# Patient Record
Sex: MALE | Race: WHITE | NOT HISPANIC OR LATINO | URBAN - METROPOLITAN AREA
[De-identification: names, ages, dates, MRNs, and addresses within clinical notes are randomized per-mention and may not be internally consistent; named-entity substitution may affect disease eponyms.]

---

## 2024-12-20 ENCOUNTER — OFFICE VISIT (OUTPATIENT)
Dept: PRIMARY CARE | Facility: CLINIC | Age: 24
End: 2024-12-20
Payer: MEDICAID

## 2024-12-20 ENCOUNTER — TELEPHONE (OUTPATIENT)
Dept: PRIMARY CARE | Facility: CLINIC | Age: 24
End: 2024-12-20

## 2024-12-20 VITALS
OXYGEN SATURATION: 97 % | SYSTOLIC BLOOD PRESSURE: 152 MMHG | DIASTOLIC BLOOD PRESSURE: 88 MMHG | WEIGHT: 277.8 LBS | HEART RATE: 97 BPM

## 2024-12-20 DIAGNOSIS — B37.2 INTERTRIGINOUS CANDIDIASIS: ICD-10-CM

## 2024-12-20 DIAGNOSIS — L01.00 IMPETIGO: Primary | ICD-10-CM

## 2024-12-20 PROCEDURE — 99203 OFFICE O/P NEW LOW 30 MIN: CPT | Performed by: FAMILY MEDICINE

## 2024-12-20 RX ORDER — CEPHALEXIN 500 MG/1
500 CAPSULE ORAL 4 TIMES DAILY
Qty: 28 CAPSULE | Refills: 0 | Status: SHIPPED | OUTPATIENT
Start: 2024-12-20 | End: 2024-12-27

## 2024-12-20 RX ORDER — NYSTATIN 100000 U/G
CREAM TOPICAL 2 TIMES DAILY
Qty: 30 G | Refills: 0 | Status: SHIPPED | OUTPATIENT
Start: 2024-12-20 | End: 2024-12-27

## 2024-12-20 ASSESSMENT — ENCOUNTER SYMPTOMS
UNEXPECTED WEIGHT CHANGE: 0
PALPITATIONS: 0
VOMITING: 0
CONSTIPATION: 0

## 2024-12-20 NOTE — TELEPHONE ENCOUNTER
Patient's mother, Yessi called requesting a call from Dr. Loredo. Would not relay any further information [phone]

## 2024-12-20 NOTE — PROGRESS NOTES
Subjective   Patient ID: Mihir David is a 24 y.o. male who presents for Rash (Pt started to notice the rash 2-3 weeks ago.  The rash has gone and come back.  No itching and no discharge. No pain with the rash, only when he bends over.  ).    Rash  Pertinent negatives include no congestion or vomiting.      They are using over-the-counter hydrocortisone cream on it  It hurts when he bends over or touches the areas  Involves abdominal area  No other parts of the body are affected    Review of Systems   Constitutional:  Negative for unexpected weight change.   HENT:  Negative for congestion and ear discharge.    Cardiovascular:  Negative for chest pain and palpitations.   Gastrointestinal:  Negative for constipation and vomiting.   Skin:  Positive for rash.   All other systems reviewed and are negative.      Objective   /88   Pulse 97   Wt 126 kg (277 lb 12.8 oz)   SpO2 97%     Physical Exam  HENT:      Head: Normocephalic and atraumatic.      Nose: Nose normal.      Mouth/Throat:      Mouth: Mucous membranes are moist.      Pharynx: No oropharyngeal exudate.   Eyes:      Extraocular Movements: Extraocular movements intact.      Conjunctiva/sclera: Conjunctivae normal.      Pupils: Pupils are equal, round, and reactive to light.   Cardiovascular:      Rate and Rhythm: Normal rate and regular rhythm.   Pulmonary:      Effort: Pulmonary effort is normal.      Breath sounds: Normal breath sounds.   Abdominal:      General: There is no distension.      Palpations: Abdomen is soft.   Musculoskeletal:      Cervical back: Normal range of motion and neck supple.   Lymphadenopathy:      Cervical: No cervical adenopathy.   Neurological:      General: No focal deficit present.      Mental Status: He is alert.   Psychiatric:         Attention and Perception: Attention normal.         Speech: Speech normal.         Behavior: Behavior is cooperative.     Integumentary: Large 4 x 20 cm erythematous raw areas  bilateral mid abdomen extending to flanks and a similar-appearing area in and surrounding the umbilicus  Slight overlying crust but does not appear honey crusted like    Assessment/Plan   Problem List Items Addressed This Visit    None  Visit Diagnoses         Codes    Impetigo    -  Primary L01.00    Relevant Medications    cephalexin (Keflex) 500 mg capsule    Intertriginous candidiasis     B37.2    Relevant Medications    nystatin (Mycostatin) cream        Benefits discussed and add medication as directed  If this is not helpful consider labs and consider dermatology referral  RTC 1 week if not better sooner if worse

## 2024-12-23 ENCOUNTER — TELEPHONE (OUTPATIENT)
Dept: PRIMARY CARE | Facility: CLINIC | Age: 24
End: 2024-12-23
Payer: MEDICAID

## 2024-12-23 DIAGNOSIS — B37.2 INTERTRIGINOUS CANDIDIASIS: ICD-10-CM

## 2024-12-23 RX ORDER — NYSTATIN 100000 U/G
CREAM TOPICAL 2 TIMES DAILY
Qty: 30 G | Refills: 0 | Status: SHIPPED | OUTPATIENT
Start: 2024-12-23 | End: 2024-12-27 | Stop reason: SDUPTHER

## 2024-12-23 NOTE — TELEPHONE ENCOUNTER
Medication Refill Request:       Medication: nystatin (Mycostatin) cream    Qty: 30 g      LOV: 12/20/2024    NOV:      Pharmacy: [facility]

## 2024-12-27 ENCOUNTER — TELEPHONE (OUTPATIENT)
Dept: PRIMARY CARE | Facility: CLINIC | Age: 24
End: 2024-12-27
Payer: MEDICAID

## 2024-12-27 DIAGNOSIS — B37.2 INTERTRIGINOUS CANDIDIASIS: ICD-10-CM

## 2024-12-27 RX ORDER — NYSTATIN 100000 U/G
CREAM TOPICAL 2 TIMES DAILY
Qty: 30 G | Refills: 0 | Status: SHIPPED | OUTPATIENT
Start: 2024-12-27 | End: 2025-01-03

## 2024-12-27 NOTE — TELEPHONE ENCOUNTER
Medication Refill Request:       Medication: nystatin (Mycostatin) cream    Qty: 30 g       LOV: 12/20/2024    NOV:      Pharmacy: [facility]

## 2024-12-30 ENCOUNTER — TELEPHONE (OUTPATIENT)
Dept: PRIMARY CARE | Facility: CLINIC | Age: 24
End: 2024-12-30
Payer: MEDICAID

## 2024-12-30 DIAGNOSIS — R21 RASH: ICD-10-CM

## 2024-12-30 DIAGNOSIS — B37.2 INTERTRIGINOUS CANDIDIASIS: Primary | ICD-10-CM

## 2024-12-30 DIAGNOSIS — L01.00 IMPETIGO: ICD-10-CM

## 2024-12-30 NOTE — TELEPHONE ENCOUNTER
Pts mother Yessi is asking if MARIO put in lab orders and a referral for Dermatology as discussed on pts appt. 12/20/2024. Pt is asking if the referral and lab order can be faxed to :  [phone] ATTN:  Yessi

## 2024-12-31 ENCOUNTER — TELEPHONE (OUTPATIENT)
Dept: PRIMARY CARE | Facility: CLINIC | Age: 24
End: 2024-12-31
Payer: MEDICAID

## 2024-12-31 DIAGNOSIS — B37.2 INTERTRIGINOUS CANDIDIASIS: ICD-10-CM

## 2024-12-31 RX ORDER — NYSTATIN 100000 U/G
CREAM TOPICAL 2 TIMES DAILY
Qty: 30 G | Refills: 0 | Status: SHIPPED | OUTPATIENT
Start: 2024-12-31 | End: 2025-01-07

## 2024-12-31 NOTE — TELEPHONE ENCOUNTER
MEDICATION REFILL    Pt's mom, Yessi, called in and stated they are packing and the Rx can not be found.  They need to see if a replacement can be sent in. Yessi was notified this would be out of pocket; but. as they are self-pay she is ok with this.       Pharmacy Name:   [facility]  Medication requested                  Nystatin   30 g   Dosage    apply 2 x a day to affected area   Quantity       30 days   Allergies    none given  Date of last visit    12/20/2024  Date of Next Appointment

## 2025-01-06 ENCOUNTER — APPOINTMENT (OUTPATIENT)
Dept: RADIOLOGY | Facility: HOSPITAL | Age: 25
End: 2025-01-06
Payer: MEDICAID

## 2025-01-06 ENCOUNTER — TELEPHONE (OUTPATIENT)
Dept: PRIMARY CARE | Facility: CLINIC | Age: 25
End: 2025-01-06
Payer: MEDICAID

## 2025-01-06 ENCOUNTER — HOSPITAL ENCOUNTER (EMERGENCY)
Facility: HOSPITAL | Age: 25
Discharge: HOME | End: 2025-01-06
Payer: MEDICAID

## 2025-01-06 VITALS
SYSTOLIC BLOOD PRESSURE: 198 MMHG | HEIGHT: 70 IN | RESPIRATION RATE: 18 BRPM | HEART RATE: 67 BPM | DIASTOLIC BLOOD PRESSURE: 136 MMHG | BODY MASS INDEX: 40.09 KG/M2 | WEIGHT: 280 LBS | OXYGEN SATURATION: 97 % | TEMPERATURE: 97.9 F

## 2025-01-06 DIAGNOSIS — B37.9 CANDIDIASIS: Primary | ICD-10-CM

## 2025-01-06 DIAGNOSIS — S93.509A SPRAIN OF TOE, INITIAL ENCOUNTER: ICD-10-CM

## 2025-01-06 DIAGNOSIS — B37.2 INTERTRIGINOUS CANDIDIASIS: ICD-10-CM

## 2025-01-06 PROCEDURE — 99283 EMERGENCY DEPT VISIT LOW MDM: CPT

## 2025-01-06 PROCEDURE — 73630 X-RAY EXAM OF FOOT: CPT | Mod: LT

## 2025-01-06 PROCEDURE — 73630 X-RAY EXAM OF FOOT: CPT | Mod: LEFT SIDE | Performed by: RADIOLOGY

## 2025-01-06 RX ORDER — NYSTATIN 100000 U/G
CREAM TOPICAL 2 TIMES DAILY
Qty: 30 G | Refills: 0 | Status: SHIPPED | OUTPATIENT
Start: 2025-01-06 | End: 2025-01-13

## 2025-01-06 RX ORDER — NYSTATIN 100000 U/G
1 CREAM TOPICAL 2 TIMES DAILY
Qty: 30 G | Refills: 1 | Status: SHIPPED | OUTPATIENT
Start: 2025-01-06 | End: 2025-01-27

## 2025-01-06 ASSESSMENT — LIFESTYLE VARIABLES
EVER FELT BAD OR GUILTY ABOUT YOUR DRINKING: NO
HAVE YOU EVER FELT YOU SHOULD CUT DOWN ON YOUR DRINKING: NO
EVER HAD A DRINK FIRST THING IN THE MORNING TO STEADY YOUR NERVES TO GET RID OF A HANGOVER: NO
HAVE PEOPLE ANNOYED YOU BY CRITICIZING YOUR DRINKING: NO
TOTAL SCORE: 0

## 2025-01-06 ASSESSMENT — COLUMBIA-SUICIDE SEVERITY RATING SCALE - C-SSRS
6. HAVE YOU EVER DONE ANYTHING, STARTED TO DO ANYTHING, OR PREPARED TO DO ANYTHING TO END YOUR LIFE?: NO
2. HAVE YOU ACTUALLY HAD ANY THOUGHTS OF KILLING YOURSELF?: NO
1. IN THE PAST MONTH, HAVE YOU WISHED YOU WERE DEAD OR WISHED YOU COULD GO TO SLEEP AND NOT WAKE UP?: NO

## 2025-01-06 ASSESSMENT — PAIN DESCRIPTION - PAIN TYPE: TYPE: ACUTE PAIN

## 2025-01-06 ASSESSMENT — PAIN DESCRIPTION - ORIENTATION: ORIENTATION: RIGHT

## 2025-01-06 ASSESSMENT — PAIN - FUNCTIONAL ASSESSMENT: PAIN_FUNCTIONAL_ASSESSMENT: 0-10

## 2025-01-06 ASSESSMENT — PAIN SCALES - GENERAL: PAINLEVEL_OUTOF10: 4

## 2025-01-06 ASSESSMENT — PAIN DESCRIPTION - LOCATION: LOCATION: FOOT

## 2025-01-06 ASSESSMENT — PAIN DESCRIPTION - DESCRIPTORS: DESCRIPTORS: ACHING

## 2025-01-06 NOTE — ED TRIAGE NOTES
Patient c/o left foot and leg pain that started today after he was taking the garbage out and slipped on ice and fell bending his left foot backwards. Patient states it is painful to ambulate and bear weight on his left foot, pain goes from his left knee down to his toes.

## 2025-01-06 NOTE — TELEPHONE ENCOUNTER
Medication Refill Request:     Patient's mother is asking for refills as he's going through this often due to the rash being so large.    Patient is completely out     Medication: nystatin (Mycostatin) cream    Qty: 30 g       LOV: 12/20/2024    NOV:      Pharmacy: [facility]

## 2025-01-07 NOTE — ED PROVIDER NOTES
HPI   Chief Complaint   Patient presents with    Foot Injury       History of present illness:  24-year-old male presents the emergency room for complaints of injury to his left foot.  The patient states he was going down some wooden steps when he slipped and unfortunately struck his left foot and great toe on a wooden step.  He states he was wearing boots but he felt like his foot bent backwards slightly.  He states he is having some pain over the top of his left toe since this happened he states its painful to walk on it but he states he has no other injuries at this time.  He states that he also has been having a rash that has been doing with across his abdomen for the past month.  He states initially was putting hydrocortisone cream on it which seemed to make it worse and was actually causing the burn.  He states his doctor gave him some kind of cream that he believes is an antifungal that seemed to help but he ran out and he recently reached out to his primary care doctor but was told that he is on vacation until the end of next week and was hoping they could have a refill of this as well.  He states he has no other medical history and does not have any prior injuries or surgeries to his left foot.    Social history: Negative for alcohol and drug use.    Review of systems:   Gen.: No weight loss, fatigue, anorexia, insomnia, fever.   Eyes: No vision loss, double vision, drainage, eye pain.   ENT: No pharyngitis, neck pain  Cardiac: No chest pain, palpitations, syncope, near syncope.   Pulmonary: No shortness of breath, cough, hemoptysis.   Heme/lymph: No swollen glands, fever, bleeding.   GI: No abdominal pain, change in bowel habits, melena, hematemesis, hematochezia, nausea, vomiting, diarrhea.   : No discharge, dysuria, frequency, urgency, hematuria.   Musculoskeletal: No  joint pain, joint swelling.   Skin: No rashes.   Review of systems is otherwise negative unless stated above or in history of present  illness.        Physical exam:  General: Vitals noted, no distress. Afebrile.   EENT: No lymphadenopathy appreciated  Cardiac: Regular, rate, rhythm, no murmur.   Pulmonary: Lungs clear bilaterally with good aeration. No adventitious breath sounds.   Abdomen: Soft, nonsurgical. Nontender. No peritoneal signs. Normoactive bowel sounds.   Extremities: No peripheral edema.  There is no obvious bruising or swelling present on examination of the left toe, full range of motion of all the toes are present good cap refill and sensation present all the toes at this time +2 pedal pulse present  Skin: There is an erythematous rash that makes the most band but is broken to 3 sections is present across the patient's abdomen with it being most faded on the left side and being the most prominent near the umbilicus.  It appears to be possibly an eczema rash, there is no obvious blisters present or signs of shingles and spreads across the center of the abdomen.  There is no obvious signs of cellulitis present and there are poorly demarcated edges with no obvious scaling present.  Neuro: No focal neurologic deficits      Medical decision making:   Testing: Left foot x-rays as interpreted by myself did not show any acute fractures or dislocations the nailbed appears intact as well and physical exam  Plan: Home-going.  Discussed differential. Will follow-up with the primary physician in the next 2-3 days. Return if worse. They understand return precautions and discharge instructions. Patient and family/friend/caregiver are in agreement with this plan. 24-year-old male presents the emergency room for complaints of injury to his left foot.  The patient states he was going down some wooden steps when he slipped and unfortunately struck his left foot and great toe on a wooden step.  He states he was wearing boots but he felt like his foot bent backwards slightly.  He states he is having some pain over the top of his left toe since this  happened he states its painful to walk on it but he states he has no other injuries at this time.  He states that he also has been having a rash that has been doing with across his abdomen for the past month.  He states initially was putting hydrocortisone cream on it which seemed to make it worse and was actually causing the burn.  He states his doctor gave him some kind of cream that he believes is an antifungal that seemed to help but he ran out and he recently reached out to his primary care doctor but was told that he is on vacation until the end of next week and was hoping they could have a refill of this as well.  He states he has no other medical history and does not have any prior injuries or surgeries to his left foot. Extremities: No peripheral edema.  There is no obvious bruising or swelling present on examination of the left toe, full range of motion of all the toes are present good cap refill and sensation present all the toes at this time +2 pedal pulse present  Skin: There is an erythematous rash that makes the most band but is broken to 3 sections is present across the patient's abdomen with it being most faded on the left side and being the most prominent near the umbilicus.  It appears to be possibly an eczema rash, there is no obvious blisters present or signs of shingles and spreads across the center of the abdomen.  There is no obvious signs of cellulitis present and there are poorly demarcated edges with no obvious scaling present.  I explained to the patient that the x-ray did not show any acute findings at this time and per the EMR it appears that the patient was on nystatin lotion.  I explained to him would be happy to refill this for him at this time which was done.  The patient will follow-up with his primary care doctor in outpatient setting.  Impression:   1.  Toe sprain  2.  Candidiasis rash          History provided by:  Patient   used: No            Patient  History   Past Medical History:   Diagnosis Date    Allergy status to unspecified drugs, medicaments and biological substances     History of seasonal allergies    Personal history of (healed) traumatic fracture     History of fracture of upper extremity     History reviewed. No pertinent surgical history.  No family history on file.  Social History     Tobacco Use    Smoking status: Some Days     Types: Cigarettes, Cigars    Smokeless tobacco: Current    Tobacco comments:     Chewing tobacco   Substance Use Topics    Alcohol use: Yes     Comment: occasional    Drug use: Never       Physical Exam   ED Triage Vitals [01/06/25 1703]   Temperature Heart Rate Respirations BP   36.6 °C (97.9 °F) 67 18 (!) 198/136      Pulse Ox Temp Source Heart Rate Source Patient Position   97 % Temporal Monitor --      BP Location FiO2 (%)     -- --       Physical Exam      ED Course & MDM   Diagnoses as of 01/07/25 0936   Candidiasis   Sprain of toe, initial encounter                 No data recorded     [place] Coma Scale Score: 15 (01/06/25 1704 : Ramos Antoine RN)                           Medical Decision Making      Procedure  Procedures     Marino Kwan PA-C  01/07/25 0941

## 2025-01-13 ENCOUNTER — TELEPHONE (OUTPATIENT)
Dept: PRIMARY CARE | Facility: CLINIC | Age: 25
End: 2025-01-13
Payer: MEDICAID

## 2025-01-13 DIAGNOSIS — B37.2 INTERTRIGINOUS CANDIDIASIS: ICD-10-CM

## 2025-01-13 RX ORDER — NYSTATIN 100000 U/G
CREAM TOPICAL 2 TIMES DAILY
Qty: 30 G | Refills: 0 | Status: SHIPPED | OUTPATIENT
Start: 2025-01-13 | End: 2025-01-20

## 2025-01-13 NOTE — TELEPHONE ENCOUNTER
Medication Refill Request:       Medication: nystatin (Mycostatin) cream    Qty: 30g      LOV: 12/20/2024    NOV:      Pharmacy: [facility]